# Patient Record
Sex: FEMALE | Race: BLACK OR AFRICAN AMERICAN | NOT HISPANIC OR LATINO | Employment: PART TIME | URBAN - METROPOLITAN AREA
[De-identification: names, ages, dates, MRNs, and addresses within clinical notes are randomized per-mention and may not be internally consistent; named-entity substitution may affect disease eponyms.]

---

## 2017-11-10 ENCOUNTER — ALLIED HEALTH/NURSE VISIT (OUTPATIENT)
Dept: NURSING | Facility: CLINIC | Age: 22
End: 2017-11-10
Payer: COMMERCIAL

## 2017-11-10 DIAGNOSIS — Z23 NEED FOR PROPHYLACTIC VACCINATION AND INOCULATION AGAINST INFLUENZA: Primary | ICD-10-CM

## 2017-11-10 PROCEDURE — 90686 IIV4 VACC NO PRSV 0.5 ML IM: CPT

## 2017-11-10 PROCEDURE — 90471 IMMUNIZATION ADMIN: CPT

## 2017-11-10 PROCEDURE — 99207 ZZC NO CHARGE NURSE ONLY: CPT

## 2017-11-10 NOTE — MR AVS SNAPSHOT
After Visit Summary   11/10/2017    Kamilah Lambert    MRN: [id number]           Patient Information     Date Of Birth          1995        Visit Information        Provider Department      11/10/2017 10:40 AM [facility]        Today's Diagnoses     Need for prophylactic vaccination and inoculation against influenza    -  1       Follow-ups after your visit        Who to contact     If you have questions or need follow up information about today's clinic visit or your schedule please contact [facility] directly at [phone].  Normal or non-critical lab and imaging results will be communicated to you by [employer], letter or phone within 4 business days after the clinic has received the results. If you do not hear from us within 7 days, please contact the clinic through [employer] or phone. If you have a critical or abnormal lab result, we will notify you by phone as soon as possible.  Submit refill requests through [employer] or call your pharmacy and they will forward the refill request to us. Please allow 3 business days for your refill to be completed.          Additional Information About Your Visit        MyChart Information     [employer] gives you secure access to your electronic health record. If you see a primary care provider, you can also send messages to your care team and make appointments. If you have questions, please call your primary care clinic.  If you do not have a primary care provider, please call [phone] and they will assist you.        Care EveryWhere ID     This is your Care EveryWhere ID. This could be used by other organizations to access your [facility] medical records  [id number]         Blood Pressure from Last 3 Encounters:   01/14/16 127/76   09/20/15 112/77   06/01/15 136/83    Weight from Last 3 Encounters:   01/14/16 220 lb (99.8 kg) (99 %)*   09/20/15 217 lb (98.4 kg) (99 %)*   06/01/15 219 lb (99.3 kg) (99 %)*      * Growth percentiles are based on [facility] 2-20 Years data.              We Performed the Following     FLU VAC, SPLIT VIRUS IM > 3 YO (QUADRIVALENT) [82484]     Vaccine Administration, Initial [[id number]]        Primary Care Provider Office Phone # Fax #    Jean Pierre Broussard [phone]       [address]  [facility] [address]        Equal Access to Services     [facility]: Hadii aad ku hadasho Soomaali, waaxda luqadaha, qaybta kaalmada adeegyada, waxay idiin hayaan adeeg kharash la'aan ah. So [facility] [phone].    ATENCIÓN: Si habla español, tiene a ramirez disposición servicios gratuitos de asistencia lingüística. Llame al [phone].    We comply with applicable federal civil rights laws and [place] laws. We do not discriminate on the basis of race, color, national origin, age, disability, sex, sexual orientation, or gender identity.            Thank you!     Thank you for choosing [facility]  for your care. Our goal is always to provide you with excellent care. Hearing back from our patients is one way we can continue to improve our services. Please take a few minutes to complete the written survey that you may receive in the mail after your visit with us. Thank you!             Your Updated Medication List - Protect others around you: Learn how to safely use, store and throw away your medicines at www.disposemymeds.org.      Notice  As of 11/10/2017 10:50 AM    You have not been prescribed any medications.

## 2017-11-10 NOTE — PROGRESS NOTES

## 2018-12-06 ENCOUNTER — TELEPHONE (OUTPATIENT)
Dept: FAMILY MEDICINE | Facility: CLINIC | Age: 23
End: 2018-12-06

## 2018-12-06 DIAGNOSIS — A09 TRAVELER'S DIARRHEA: ICD-10-CM

## 2018-12-06 DIAGNOSIS — Z79.2 PROPHYLACTIC ANTIBIOTIC: ICD-10-CM

## 2018-12-06 DIAGNOSIS — Z71.84 TRAVEL ADVICE ENCOUNTER: Primary | ICD-10-CM

## 2018-12-06 RX ORDER — MEFLOQUINE HYDROCHLORIDE 250 MG/1
250 TABLET ORAL
Qty: 12 TABLET | Refills: 0 | Status: SHIPPED | OUTPATIENT
Start: 2018-12-06 | End: 2018-12-22

## 2018-12-06 NOTE — TELEPHONE ENCOUNTER
Reason for call:  Medication   If this is a refill request, has the caller requested the refill from the pharmacy already? No  Will the patient be using a [facility]? No  Name of the pharmacy and phone number for the current request: [facility] pharmacy  [phone]    Name of the medication requested: malaria medicine    Other request: she will be gone for 4 weeks    Phone number to reach patient:  Work number on file:  [phone] (work)    Best Time:  any    Can we leave a detailed message on this number?  YES

## 2018-12-06 NOTE — TELEPHONE ENCOUNTER
Routing to provider to review and advise. Patient asking for Malaria medication, will be traveling out of country and gone for 4 weeks.   Patient has not seen you since 2016. Last primary visit was same date.  Are you willing to fill? Otherwise, suggest  or [place] [facility], unless have other thoughts?  Thank you!    Marlena Moya RN  [facility] Triage

## 2018-12-07 NOTE — TELEPHONE ENCOUNTER
This writer attempted to contact patient on 12/07/18      Reason for call medication request and left detailed message.      If patient calls back:   Relay message , (read verbatim), document that pt called and close encounter        Estephania Thornton, CMA

## 2018-12-21 NOTE — TELEPHONE ENCOUNTER
Reason for Call:  Other prescription    Detailed comments: [place] for possible infections she may inquire.  [facility] - [facility], [place] - [address]   Please call when ready.    Phone Number Patient can be reached at: Cell number on file:    Telephone Information:   Mobile [phone]     Best Time: any    Can we leave a detailed message on this number? YES    Call taken on 12/21/2018 at 3:21 PM by Nannette Woodruff

## 2018-12-22 DIAGNOSIS — Z79.2 PROPHYLACTIC ANTIBIOTIC: ICD-10-CM

## 2018-12-22 DIAGNOSIS — Z71.84 TRAVEL ADVICE ENCOUNTER: ICD-10-CM

## 2018-12-22 NOTE — TELEPHONE ENCOUNTER
Requested Prescriptions   Pending Prescriptions Disp Refills     mefloquine (LARIAM) 250 MG tablet [Pharmacy Med Name: mefloquine 250 mg tablet]      Last Written Prescription Date:  12/6/18  Last Fill Quantity: 12,  # refills: 0   Last Office Visit with [facility], [facility] or [facility] prescribing provider:  1/14/16   Future Office Visit:      12 tablet 0     Sig: Take 1 tablet (250 mg) by mouth every 7 days on the same day each week.  Begin 1 week before travel & continue for 4 weeks after return home    There is no refill protocol information for this order              Federico Faarax  [facility]

## 2018-12-24 NOTE — TELEPHONE ENCOUNTER
Routing refill request to provider for review/approval because:  Drug not on the [facility] refill protocol.    Agueda Abbott RN, [facility]

## 2018-12-26 RX ORDER — AZITHROMYCIN 250 MG/1
500 TABLET, FILM COATED ORAL DAILY
Qty: 6 TABLET | Refills: 0 | Status: SHIPPED | OUTPATIENT
Start: 2018-12-26 | End: 2018-12-29

## 2018-12-28 RX ORDER — MEFLOQUINE HYDROCHLORIDE 250 MG/1
250 TABLET ORAL
Qty: 12 TABLET | Refills: 0 | Status: SHIPPED | OUTPATIENT
Start: 2018-12-28 | End: 2019-08-23

## 2019-08-12 ENCOUNTER — OFFICE VISIT (OUTPATIENT)
Dept: URGENT CARE | Facility: URGENT CARE | Age: 24
End: 2019-08-12
Payer: COMMERCIAL

## 2019-08-12 ENCOUNTER — TRANSFERRED RECORDS (OUTPATIENT)
Dept: HEALTH INFORMATION MANAGEMENT | Facility: CLINIC | Age: 24
End: 2019-08-12

## 2019-08-12 VITALS
OXYGEN SATURATION: 100 % | TEMPERATURE: 98.8 F | RESPIRATION RATE: 16 BRPM | HEIGHT: 67 IN | WEIGHT: 231.4 LBS | SYSTOLIC BLOOD PRESSURE: 114 MMHG | DIASTOLIC BLOOD PRESSURE: 80 MMHG | BODY MASS INDEX: 36.32 KG/M2 | HEART RATE: 96 BPM
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
## 2019-08-12 DIAGNOSIS — M54.2 CERVICAL PAIN: ICD-10-CM

## 2019-08-12 DIAGNOSIS — M54.9 ACUTE BILATERAL BACK PAIN, UNSPECIFIED BACK LOCATION: ICD-10-CM

## 2019-08-12 PROCEDURE — 99214 OFFICE O/P EST MOD 30 MIN: CPT | Performed by: NURSE PRACTITIONER

## 2019-08-12 ASSESSMENT — ENCOUNTER SYMPTOMS
NAUSEA: 0
BACK PAIN: 1
PHOTOPHOBIA: 0
LIGHT-HEADEDNESS: 0
HEADACHES: 0
DIZZINESS: 0
WEAKNESS: 0
NECK STIFFNESS: 1
CONSTITUTIONAL NEGATIVE: 1
MYALGIAS: 1
NECK PAIN: 1
PSYCHIATRIC NEGATIVE: 1
NUMBNESS: 0

## 2019-08-12 ASSESSMENT — MIFFLIN-ST. JEOR: SCORE: 1852.37

## 2019-08-12 ASSESSMENT — PAIN SCALES - GENERAL: PAINLEVEL: MODERATE PAIN (5)
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
## 2019-08-23 ENCOUNTER — OFFICE VISIT (OUTPATIENT)
Dept: FAMILY MEDICINE | Facility: CLINIC | Age: 24
End: 2019-08-23
Payer: COMMERCIAL

## 2019-08-23 VITALS
BODY MASS INDEX: 36.57 KG/M2 | HEIGHT: 67 IN | OXYGEN SATURATION: 99 % | SYSTOLIC BLOOD PRESSURE: 126 MMHG | DIASTOLIC BLOOD PRESSURE: 84 MMHG | WEIGHT: 233 LBS | HEART RATE: 103 BPM | TEMPERATURE: 98.8 F
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
## 2019-08-23 DIAGNOSIS — M41.9 SCOLIOSIS OF CERVICOTHORACIC SPINE, UNSPECIFIED SCOLIOSIS TYPE: ICD-10-CM

## 2019-08-23 DIAGNOSIS — M41.9 SCOLIOSIS OF LUMBAR SPINE, UNSPECIFIED SCOLIOSIS TYPE: Primary | ICD-10-CM

## 2019-08-23 DIAGNOSIS — M54.50 ACUTE MIDLINE LOW BACK PAIN WITHOUT SCIATICA: ICD-10-CM

## 2019-08-23 PROBLEM — E66.812 OBESITY, CLASS II, BMI 35-39.9: Status: ACTIVE | Noted: 2019-08-23

## 2019-08-23 PROCEDURE — 99213 OFFICE O/P EST LOW 20 MIN: CPT | Performed by: FAMILY MEDICINE

## 2019-08-23 RX ORDER — CYCLOBENZAPRINE HCL 10 MG
10 TABLET ORAL
COMMUNITY
Start: 2019-08-12 | End: 2024-01-26

## 2019-08-23 ASSESSMENT — MIFFLIN-ST. JEOR: SCORE: 1859.63

## 2019-08-23 ASSESSMENT — PAIN SCALES - GENERAL: PAINLEVEL: SEVERE PAIN (6)
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
## 2019-08-23 NOTE — PATIENT INSTRUCTIONS
At [facility], we strive to deliver an exceptional experience to you, every time we see you.  If you receive a survey in the mail, please send us back your thoughts. We really do value your feedback.    Your care team:                            Family Medicine Internal Medicine   MD Eb Meek MD Shantel Branch-Fleming, MD Katya Georgiev PA-C Megan Hill, APRN CNP    Barrington Ruiz MD Pediatrics   Miguelangel Winston, ROLANDA Carter, MD Areli Finn APRN CNP   MD Sue Anguiano MD Deborah Mielke, MD Kellie Pisano, APRN [facility]      Clinic hours: Monday - Thursday 7 am-7 pm; Fridays 7 am-5 pm.   Urgent care: Monday - Friday 11 am-9 pm; Saturday and Sunday 9 am-5 pm.  Pharmacy : Monday -Thursday 8 am-8 pm; Friday 8 am-6 pm; Saturday and Sunday 9 am-5 pm.     Clinic: [phone]   Pharmacy: [phone]            Patient Education     Your Scoliosis Evaluation  If a school screening or your health care provider finds signs of scoliosis, you ll visit a special bone healthcare provider (orthopedist) for an evaluation. This visit helps determine what treatment may be best for you.    During your evaluation  Your bone health care provider may:    Ask you about your health history (for instance, if you ve ever had surgery) and family history (if someone in your family has scoliosis)    Take X-rays of your back to get a closer look at your spine    Decide how much more you are likely to grow. (To help do this, the health care provider may ask girls if they ve gotten their period yet.)    Examine your back while you re standing up and bending over    Measure the size, location, and pattern of the spinal curve    Test how flexible your spine is, and how strong your back and neck muscles are  Why treat scoliosis?  A serious spinal curve that isn t treated can get worse as you grow. Over time, it may cause problems. These can include:    Back  pain    Arthritis in your back    Your body looking twisted    Clothes not fitting right    Heart and breathing problems (rare)  Date Last Reviewed: 5/1/2018 2000-2018 [employer]. [address] [place] [address]. All rights reserved. This information is not intended as a substitute for professional medical care. Always follow your healthcare professional's instructions.           Patient Education     Anatomy of a Normal Spine  The spinal column is a stack of bones (vertebrae) that are  by soft pads of tissue (disks). In the middle of each of these bones there is a canal that runs top to bottom. Together these canals form a tunnel called the spinal canal. Running through the spinal canal is a long sausage-like bundle of nerves and nerve cells called the spinal cord. These nerve fibers carry signals between the brain and body. The spinal cord is surrounded by the cerebrospinal fluid and protective layers called meninges, just like the brain.     The spine has three natural curves: the cervical, the thoracic, and the lumbar.   The parts of the spine  The spine is made up of the following parts:    The vertebrae (not including the sacrum and coccyx) are the 24 bones that connect like puzzle pieces to make up the spine.    The lamina of each vertebra forms the back of the spinal canal.    A foramen is a small bony opening. This is where a nerve, on each side of the spinal cord, leaves the spinal canal.    The transverse process is the wing of bone on either side of each vertebra.    The spinous process is the back part of each vertebra you can feel through your skin.    A disk lies between each of the vertebrae and acts as a cushion.     Two vertebrae with a disk between them   Date Last Reviewed: 5/1/2018 2000-2018 [employer]. [address] [place] [address]. All rights reserved. This information is not intended as a substitute for professional medical  care. Always follow your healthcare professional's instructions.
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
## 2019-08-30 ENCOUNTER — ANCILLARY PROCEDURE (OUTPATIENT)
Dept: GENERAL RADIOLOGY | Facility: CLINIC | Age: 24
End: 2019-08-30
Attending: PEDIATRICS
Payer: COMMERCIAL

## 2019-08-30 ENCOUNTER — OFFICE VISIT (OUTPATIENT)
Dept: ORTHOPEDICS | Facility: CLINIC | Age: 24
End: 2019-08-30
Payer: COMMERCIAL

## 2019-08-30 VITALS
HEIGHT: 67 IN | WEIGHT: 233 LBS | DIASTOLIC BLOOD PRESSURE: 90 MMHG | SYSTOLIC BLOOD PRESSURE: 130 MMHG | BODY MASS INDEX: 36.57 KG/M2

## 2019-08-30 DIAGNOSIS — M41.9 SCOLIOSIS OF THORACOLUMBAR SPINE, UNSPECIFIED SCOLIOSIS TYPE: ICD-10-CM

## 2019-08-30 DIAGNOSIS — M54.5 ACUTE BILATERAL LOW BACK PAIN, WITH SCIATICA PRESENCE UNSPECIFIED: Primary | ICD-10-CM

## 2019-08-30 PROCEDURE — 72100 X-RAY EXAM L-S SPINE 2/3 VWS: CPT

## 2019-08-30 PROCEDURE — 72070 X-RAY EXAM THORAC SPINE 2VWS: CPT

## 2019-08-30 PROCEDURE — 99243 OFF/OP CNSLTJ NEW/EST LOW 30: CPT | Performed by: PEDIATRICS

## 2019-08-30 ASSESSMENT — MIFFLIN-ST. JEOR: SCORE: 1859.59

## 2019-08-30 NOTE — PATIENT INSTRUCTIONS
-Physical therapy referral placed; phone number is [phone] for the [facility]  -Can continue with chiropractic care  -MRI may be a consideration if not improving with therapy and time    Follow up in 1 month, sooner if needed    Ifeyinwa to follow up with Primary Care provider regarding elevated blood pressure.
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
## 2020-02-23 ENCOUNTER — HEALTH MAINTENANCE LETTER (OUTPATIENT)
Age: 25
End: 2020-02-23

## 2020-10-29 ENCOUNTER — IMMUNIZATION (OUTPATIENT)
Dept: NURSING | Facility: CLINIC | Age: 25
End: 2020-10-29
Payer: COMMERCIAL

## 2020-10-29 DIAGNOSIS — Z23 NEED FOR PROPHYLACTIC VACCINATION AND INOCULATION AGAINST INFLUENZA: Primary | ICD-10-CM

## 2020-10-29 PROCEDURE — 90686 IIV4 VACC NO PRSV 0.5 ML IM: CPT

## 2020-10-29 PROCEDURE — 99207 PR NO CHARGE NURSE ONLY: CPT

## 2020-10-29 PROCEDURE — 90471 IMMUNIZATION ADMIN: CPT

## 2021-04-11 ENCOUNTER — HEALTH MAINTENANCE LETTER (OUTPATIENT)
Age: 26
End: 2021-04-11

## 2021-09-26 ENCOUNTER — HEALTH MAINTENANCE LETTER (OUTPATIENT)
Age: 26
End: 2021-09-26

## 2022-05-08 ENCOUNTER — HEALTH MAINTENANCE LETTER (OUTPATIENT)
Age: 27
End: 2022-05-08

## 2022-10-10 ENCOUNTER — ANCILLARY PROCEDURE (OUTPATIENT)
Dept: GENERAL RADIOLOGY | Facility: CLINIC | Age: 27
End: 2022-10-10
Attending: EMERGENCY MEDICINE
Payer: COMMERCIAL

## 2022-10-10 ENCOUNTER — OFFICE VISIT (OUTPATIENT)
Dept: URGENT CARE | Facility: URGENT CARE | Age: 27
End: 2022-10-10
Payer: COMMERCIAL

## 2022-10-10 VITALS
WEIGHT: 239.8 LBS | SYSTOLIC BLOOD PRESSURE: 117 MMHG | BODY MASS INDEX: 37.2 KG/M2 | OXYGEN SATURATION: 100 % | RESPIRATION RATE: 20 BRPM | TEMPERATURE: 98.2 F | HEART RATE: 90 BPM | DIASTOLIC BLOOD PRESSURE: 83 MMHG

## 2022-10-10 DIAGNOSIS — S49.92XA SHOULDER INJURY, LEFT, INITIAL ENCOUNTER: ICD-10-CM

## 2022-10-10 DIAGNOSIS — S69.91XA INJURY OF RIGHT WRIST, INITIAL ENCOUNTER: Primary | ICD-10-CM

## 2022-10-10 DIAGNOSIS — S69.91XA INJURY OF RIGHT WRIST, INITIAL ENCOUNTER: ICD-10-CM

## 2022-10-10 PROCEDURE — 73110 X-RAY EXAM OF WRIST: CPT | Mod: TC | Performed by: RADIOLOGY

## 2022-10-10 PROCEDURE — 73030 X-RAY EXAM OF SHOULDER: CPT | Mod: TC | Performed by: RADIOLOGY

## 2022-10-10 PROCEDURE — 99204 OFFICE O/P NEW MOD 45 MIN: CPT | Performed by: EMERGENCY MEDICINE

## 2022-10-10 NOTE — PROGRESS NOTES
Assessment & Plan     Diagnosis:    (S69.91XA) Injury of right wrist, initial encounter  (primary encounter diagnosis)  Plan: XR Wrist Right G/E 3 Views, Wrist/Arm/Hand         Supplies Order for DME - ONLY FOR DME,         Orthopedic  Referral    (S49.92XA) Shoulder injury, left, initial encounter      Medical Decision Making:  Kamilah Lambert is a 27 year old female who presents for evaluation of right wrist and left shoulder pain following two separate falls while ice skating over the last 3 weeks.      Differential diagnosis includes scaphoid fracture or other wrist fracture, dislocation, AC joint or sternoclavicular joint pathology, rotator cuff injury, shoulder dislocation, humoral fracture. C-spine is cleared clinically here by NEXUS criteria. There is no sign of vascular or neurologic compromise and full range of motion is intact.     X-rays of the right wrist and left shoulder are unremarkable, I suspect likely muscular or soft tissue injury in origin and patient was provided with a sling anti-inflammatories.  Patient does have some snuffbox tenderness at the right wrist and given its been 3 weeks and she continues to have tenderness here, I recommended that she follow-up with orthopedics for repeat x-rays in 2 to 3 weeks to ensure there is no occult scaphoid fracture.  Thumb-spica wrist splint placed here and I recommended Tylenol/Ibuprofen, RICE therapy and close follow up with orthopedics.    Patient voices understanding and agreement with the plan including reasons to go to the ER immediately as well as to be seen by a more consistent care-giver, such as their PCP, if the symptoms persist more 1 week.      Refugio Sexton PA-C  [facility]    Subjective     Kamilah Lambert is a 27 year old female who presents to clinic today for the following health issues:  Chief Complaint   Patient presents with     Shoulder Pain     left     Wrist Pain     right       HPI    MS  Injury/Pain    Onset of symptoms was 3 week(s) ago.  She states that she fell ice-skating twice, once was 3 weeks ago and once was 2 weeks ago.  She injured her right wrist 3 weeks ago and her left shoulder 2 weeks ago.  Since then she has been having stiffness, waxing and waning pain notes to make sure nothing is broken or dislocated.  She notes that she has some tingling sensations in her left arm once in a while, nothing consistent.  Denies any true numbness or weakness in the arms.  She denies any neck pain, head injuries, LOC, chest pain, difficulty breathing, or any other injuries.      Review of Systems    See HPI    Objective      Vitals: /83   Pulse 90   Temp 98.2  F (36.8  C) (Tympanic)   Resp 20   Wt 108.8 kg (239 lb 12.8 oz)   SpO2 100%   BMI 37.20 kg/m      Patient Vitals for the past 24 hrs:   BP Temp Temp src Pulse Resp SpO2 Weight   10/10/22 1230 117/83 98.2  F (36.8  C) Tympanic 90 20 100 % 108.8 kg (239 lb 12.8 oz)       Vital signs reviewed by: Refugio Sexton PA-C    Physical Exam   Constitutional: Patient is alert and cooperative. No acute distress.  Mouth: Mucous membranes are moist. Normal tongue and tonsil. Posterior oropharynx is clear.  Eyes: Conjunctivae, EOMI and lids are normal.  Cardiovascular: Regular rate and rhythm.  Pulmonary/Chest: Lungs are clear to auscultation throughout. Effort normal. No respiratory distress. No wheezes, rales or rhonchi  Neurological: Alert and oriented x3. Strength and sensation are intact and symmetric in the bilateral upper extremities.   MSK: Left shoulder with no bony tenderness to palpation, no AC joint tenderness, no skin tenting ecchymosis or bruising.  Range of motion intact.  Abduction and internal rotation exacerbate pain.  Distal CMS intact in the bilateral upper extremities.  Right wrist: Snuffbox tenderness.  Normal range of motion.  No swelling, ecchymosis or deformity.  Skin: No rash noted on visualized skin.  Psychiatric:The  patient has a normal mood and affect.       Labs/Imaging:  Results for orders placed or performed in visit on 10/10/22   XR Shoulder Left G/E 3 Views     Status: None    Narrative    EXAM: SHOULDER LEFT THREE OR MORE VIEWS   DATE/TIME: 10/10/2022 1:13 PM     INDICATION: Left shoulder injury and pain.  COMPARISON: None.      Impression    IMPRESSION: Normal joint spacing and alignment. No fracture.    ADELIA HARO MD         SYSTEM ID:  [email or web address]   Results for orders placed or performed in visit on 10/10/22   XR Wrist Right G/E 3 Views     Status: None    Narrative    RIGHT WRIST THREE OR MORE VIEWS  10/10/2022 1:15 PM     INDICATION: Right wrist injury and pain.    COMPARISON: None.      Impression    IMPRESSION: Normal joint spacing and alignment.  No fracture.     ADELIA HARO MD         SYSTEM ID:  [email or web address]     Reading per radiology        Refugio Sexton PA-C, October 10, 2022

## 2022-10-11 NOTE — TELEPHONE ENCOUNTER
DIAGNOSIS: Injury of right wrist, initial encounter [S69.91XA]  Shoulder injury, left, initial encounter    APPOINTMENT DATE: 10/13/2022   NOTES STATUS DETAILS   OFFICE NOTE from referring provider Internal 10/10/2022 Dr Sexton [facility]    OFFICE NOTE from other specialist N/A    DISCHARGE SUMMARY from hospital N/A    DISCHARGE REPORT from the ER N/A    OPERATIVE REPORT N/A    EMG report N/A    MEDICATION LIST N/A    MRI N/A    DEXA (osteoporosis/bone health) N/A    CT SCAN N/A    XRAYS (IMAGES & REPORTS) Internal 10/10/2022 RT wrist, LFT shoulder

## 2022-10-13 ENCOUNTER — PRE VISIT (OUTPATIENT)
Dept: ORTHOPEDICS | Facility: CLINIC | Age: 27
End: 2022-10-13

## 2022-10-13 ENCOUNTER — OFFICE VISIT (OUTPATIENT)
Dept: ORTHOPEDICS | Facility: CLINIC | Age: 27
End: 2022-10-13
Attending: EMERGENCY MEDICINE
Payer: COMMERCIAL

## 2022-10-13 DIAGNOSIS — S69.91XA INJURY OF RIGHT WRIST, INITIAL ENCOUNTER: ICD-10-CM

## 2022-10-13 DIAGNOSIS — S49.92XA SHOULDER INJURY, LEFT, INITIAL ENCOUNTER: ICD-10-CM

## 2022-10-13 PROCEDURE — 99204 OFFICE O/P NEW MOD 45 MIN: CPT | Performed by: FAMILY MEDICINE

## 2022-10-13 ASSESSMENT — PAIN SCALES - GENERAL: PAINLEVEL: SEVERE PAIN (7)

## 2022-10-13 NOTE — LETTER
10/13/2022         RE: Kamilah Lambert  [address]  [place] [address]        Dear Colleague,    Thank you for referring your patient, Kamilah Lambert, to the [facility] [place]. Please see a copy of my visit note below.    CHIEF COMPLAINT:  Pain of the Left Shoulder and Pain of the Right Wrist       HISTORY OF PRESENT ILLNESS  Ms. Lambert is a pleasant 27 year old female who presents to clinic today with pain in her shoulder and wrist.  Kamilah is taking ice-skating classes, she injured both of these while ice-skating in 2 separate events.  About 4 weeks ago she fell with an outstretched hand, feeling immediate pain at her right wrist.  In a separate fall she fell onto her left shoulder, this was about 3 weeks ago.  Her shoulder seems to be the worst issue today, although both of these are quite severe.  She was seen at urgent care few days ago and had reassuring x-rays, she was given a sling for her left shoulder and a brace for her right wrist.        Additional history: as documented    MEDICAL HISTORY  Patient Active Problem List   Diagnosis     Scoliosis of lumbar spine, unspecified scoliosis type     Scoliosis of cervicothoracic spine, unspecified scoliosis type     Obesity, Class II, BMI 35-39.9       Current Outpatient Medications   Medication Sig Dispense Refill     cyclobenzaprine (FLEXERIL) 10 MG tablet Take 10 mg by mouth (Patient not taking: Reported on 10/10/2022)         No Known Allergies    Family History   Problem Relation Age of Onset     Diabetes No family hx of      Hypertension No family hx of      Breast Cancer No family hx of      Cancer - colorectal No family hx of        Additional medical/Social/Surgical histories reviewed in [facility] and updated as appropriate.        PHYSICAL EXAM  General  - normal appearance, in no obvious distress  Musculoskeletal - right wrist  - inspection: normal joint alignment, no obvious deformity,  no swelling  - palpation: tender at anatomic snuffbox  - ROM:  90 deg flexion   70 deg extension   25 deg abduction   65 deg adduction  - strength: 5/5  strength    Musculoskeletal - left shoulder  - inspection: normal bone and joint alignment, no obvious deformity, no scapular winging, no AC step-off, mild swelling over AC joint, normal clavicle  - palpation: tender over AC joint, clavicle does not displace when pressed  - ROM: painful passive flexion past 90 deg, painful adduction  - strength: 5/5  strength, 5/5 in all shoulder planes  - special tests:  (+) crossarm  (-) Speed's  (+) Hawkin's    Neuro  - no sensory or motor deficit, grossly normal coordination, normal muscle tone             ASSESSMENT & PLAN  Ms. Lambert is a 27 year old female who presents to clinic today with pain in her right wrist and left shoulder after separate falls while ice-skating.    I reviewed her x-rays in the room with her, her both her wrist and her shoulder x-rays show no obvious acute or chronic issues.    It does seem that her left shoulder is bothering her the most, clinically she has evidence of an AC separation.  This is likely grade 2.  We discussed pathophysiology and treatment strategies, I do think that it would be in her best interest to continue to wear her sling as helpful, attend physical therapy, and to continue to work on range of motion.  I would expect that her symptoms would resolve over the coming weeks, if she is not improving I would likely recommend an MRI.    With regards to her wrist this does seem to be a more pressing issue in the sense that she has exquisite tenderness over the anatomic snuffbox.  This does raise some concern for an occult scaphoid fracture.  I am ordering an MRI of her wrist.  I will get in touch with her with the results.    Lastly, I am prescribing her Zanaflex for nighttime use.    It was a pleasure seeing Kamilah today.    Juvencio Ortega, DO, CAQSM  Primary Care Sports  Medicine      This note was constructed using Dragon dictation software, please excuse any minor errors in spelling, grammar, or syntax.        Again, thank you for allowing me to participate in the care of your patient.        Sincerely,        Juvencio Ortega, DO

## 2022-10-13 NOTE — PROGRESS NOTES
CHIEF COMPLAINT:  Pain of the Left Shoulder and Pain of the Right Wrist       HISTORY OF PRESENT ILLNESS  Ms. Lambert is a pleasant 27 year old female who presents to clinic today with pain in her shoulder and wrist.  Kamilah is taking ice-skating classes, she injured both of these while ice-skating in 2 separate events.  About 4 weeks ago she fell with an outstretched hand, feeling immediate pain at her right wrist.  In a separate fall she fell onto her left shoulder, this was about 3 weeks ago.  Her shoulder seems to be the worst issue today, although both of these are quite severe.  She was seen at urgent care few days ago and had reassuring x-rays, she was given a sling for her left shoulder and a brace for her right wrist.        Additional history: as documented    MEDICAL HISTORY  Patient Active Problem List   Diagnosis     Scoliosis of lumbar spine, unspecified scoliosis type     Scoliosis of cervicothoracic spine, unspecified scoliosis type     Obesity, Class II, BMI 35-39.9       Current Outpatient Medications   Medication Sig Dispense Refill     cyclobenzaprine (FLEXERIL) 10 MG tablet Take 10 mg by mouth (Patient not taking: Reported on 10/10/2022)         No Known Allergies    Family History   Problem Relation Age of Onset     Diabetes No family hx of      Hypertension No family hx of      Breast Cancer No family hx of      Cancer - colorectal No family hx of        Additional medical/Social/Surgical histories reviewed in [facility] and updated as appropriate.        PHYSICAL EXAM  General  - normal appearance, in no obvious distress  Musculoskeletal - right wrist  - inspection: normal joint alignment, no obvious deformity, no swelling  - palpation: tender at anatomic snuffbox  - ROM:  90 deg flexion   70 deg extension   25 deg abduction   65 deg adduction  - strength: 5/5  strength    Musculoskeletal - left shoulder  - inspection: normal bone and joint alignment, no obvious deformity, no scapular  winging, no AC step-off, mild swelling over AC joint, normal clavicle  - palpation: tender over AC joint, clavicle does not displace when pressed  - ROM: painful passive flexion past 90 deg, painful adduction  - strength: 5/5  strength, 5/5 in all shoulder planes  - special tests:  (+) crossarm  (-) Speed's  (+) Hawkin's    Neuro  - no sensory or motor deficit, grossly normal coordination, normal muscle tone             ASSESSMENT & PLAN  Ms. Lambert is a 27 year old female who presents to clinic today with pain in her right wrist and left shoulder after separate falls while ice-skating.    I reviewed her x-rays in the room with her, her both her wrist and her shoulder x-rays show no obvious acute or chronic issues.    It does seem that her left shoulder is bothering her the most, clinically she has evidence of an AC separation.  This is likely grade 2.  We discussed pathophysiology and treatment strategies, I do think that it would be in her best interest to continue to wear her sling as helpful, attend physical therapy, and to continue to work on range of motion.  I would expect that her symptoms would resolve over the coming weeks, if she is not improving I would likely recommend an MRI.    With regards to her wrist this does seem to be a more pressing issue in the sense that she has exquisite tenderness over the anatomic snuffbox.  This does raise some concern for an occult scaphoid fracture.  I am ordering an MRI of her wrist.  I will get in touch with her with the results.    Lastly, I am prescribing her Zanaflex for nighttime use.    It was a pleasure seeing Kamilah today.    Juvencio Ortega DO, [facility]  Primary Care Sports Medicine      This note was constructed using Dragon dictation software, please excuse any minor errors in spelling, grammar, or syntax.

## 2022-10-13 NOTE — NURSING NOTE
Chief Complaint   Patient presents with     Left Shoulder - Pain     Right Wrist - Pain       There were no vitals filed for this visit.    There is no height or weight on file to calculate BMI.      BARRERA Proctor NREMT

## 2023-01-05 ENCOUNTER — APPOINTMENT (OUTPATIENT)
Dept: LAB | Facility: CLINIC | Age: 28
End: 2023-01-05
Payer: COMMERCIAL

## 2023-06-02 ENCOUNTER — HEALTH MAINTENANCE LETTER (OUTPATIENT)
Age: 28
End: 2023-06-02

## 2023-11-15 ENCOUNTER — IMMUNIZATION (OUTPATIENT)
Dept: NURSING | Facility: CLINIC | Age: 28
End: 2023-11-15
Payer: COMMERCIAL

## 2023-11-15 PROCEDURE — 90686 IIV4 VACC NO PRSV 0.5 ML IM: CPT

## 2023-11-15 PROCEDURE — 90480 ADMN SARSCOV2 VAC 1/ONLY CMP: CPT

## 2023-11-15 PROCEDURE — 90471 IMMUNIZATION ADMIN: CPT

## 2023-11-15 PROCEDURE — 91320 SARSCV2 VAC 30MCG TRS-SUC IM: CPT

## 2024-01-26 ENCOUNTER — LAB (OUTPATIENT)
Dept: LAB | Facility: CLINIC | Age: 29
End: 2024-01-26
Payer: COMMERCIAL

## 2024-01-26 ENCOUNTER — VIRTUAL VISIT (OUTPATIENT)
Dept: URGENT CARE | Facility: CLINIC | Age: 29
End: 2024-01-26
Payer: COMMERCIAL

## 2024-01-26 DIAGNOSIS — R35.0 URINARY FREQUENCY: ICD-10-CM

## 2024-01-26 DIAGNOSIS — R30.0 DYSURIA: ICD-10-CM

## 2024-01-26 DIAGNOSIS — B37.31 YEAST INFECTION OF THE VAGINA: ICD-10-CM

## 2024-01-26 DIAGNOSIS — B96.89 BACTERIAL VAGINOSIS: Primary | ICD-10-CM

## 2024-01-26 DIAGNOSIS — N76.0 BACTERIAL VAGINOSIS: Primary | ICD-10-CM

## 2024-01-26 LAB
ALBUMIN UR-MCNC: NEGATIVE MG/DL
APPEARANCE UR: CLEAR
BILIRUB UR QL STRIP: NEGATIVE
CLUE CELLS: PRESENT
COLOR UR AUTO: YELLOW
GLUCOSE UR STRIP-MCNC: NEGATIVE MG/DL
HGB UR QL STRIP: NEGATIVE
KETONES UR STRIP-MCNC: NEGATIVE MG/DL
LEUKOCYTE ESTERASE UR QL STRIP: NEGATIVE
NITRATE UR QL: NEGATIVE
PH UR STRIP: 7 [PH] (ref 5–7)
SP GR UR STRIP: 1.01 (ref 1–1.03)
TRICHOMONAS, WET PREP: ABNORMAL
UROBILINOGEN UR STRIP-ACNC: 0.2 E.U./DL
WBC'S/HIGH POWER FIELD, WET PREP: ABNORMAL
YEAST, WET PREP: PRESENT

## 2024-01-26 PROCEDURE — 87210 SMEAR WET MOUNT SALINE/INK: CPT

## 2024-01-26 PROCEDURE — 81003 URINALYSIS AUTO W/O SCOPE: CPT

## 2024-01-26 PROCEDURE — 99442 PR PHYSICIAN TELEPHONE EVALUATION 11-20 MIN: CPT | Mod: 93

## 2024-01-26 PROCEDURE — 87086 URINE CULTURE/COLONY COUNT: CPT

## 2024-01-26 RX ORDER — FLUCONAZOLE 150 MG/1
150 TABLET ORAL ONCE
Qty: 1 TABLET | Refills: 0 | Status: SHIPPED | OUTPATIENT
Start: 2024-01-26 | End: 2024-01-26

## 2024-01-26 RX ORDER — METRONIDAZOLE 500 MG/1
500 TABLET ORAL 2 TIMES DAILY
Qty: 14 TABLET | Refills: 0 | Status: SHIPPED | OUTPATIENT
Start: 2024-01-26 | End: 2024-02-02

## 2024-01-26 NOTE — PROGRESS NOTES
Assessment & Plan     Bacterial vaginosis  - metroNIDAZOLE (FLAGYL) 500 MG tablet; Take 1 tablet (500 mg) by mouth 2 times daily for 7 days    Yeast infection of the vagina  - fluconazole (DIFLUCAN) 150 MG tablet; Take 1 tablet (150 mg) by mouth once for 1 dose    Dysuria  - UA reflex to Microscopic - lab collect; Future  - Urine Culture Aerobic Bacterial - lab collect; Future  - Wet prep - lab collect; Future    Urinary frequency  - UA reflex to Microscopic - lab collect; Future  - Urine Culture Aerobic Bacterial - lab collect; Future  - Wet prep - lab collect; Future    Urine sample without sign of infection, wet prep positive for clue cells and yeast.  Will treat with Flagyl and Diflucan.  Avoid alcohol while taking Flagyl.  I recommended she stop the Macrobid that she is currently taking.  Plenty of fluids.  Follow-up to be seen if symptoms worsen, fail to improve or return shortly after finishing medications.      Return in about 1 week (around 2/2/2024) for visit with primary care provider if not improving.     Ruba Rolon PA-C  [facility]    Subjective   Kamilah Lambert is a 28 year old who presents for the following health issues    HPI    Kamilah presents via telephone for evaluation of a presumed urinary tract infection.  Symptoms first began approximately 1 week ago.  She notes that she was also on her.  When the symptoms first began.  She noticed increased urinary frequency which is also symptoms she has with her period.  However even after the bleeding stopped, she noticed vaginal itching, and odor to her urine and continued urinary frequency.  She took a home UTI test which was positive and she was started on Macrobid.  Today is the third day of her Macrobid and she notes that symptoms have minimally improved but have not resolved.  Yesterday, symptoms seem to worsen once again.      Review of Systems   ROS negative except as stated above.      Objective     Physical Exam   healthy, alert and no distress  PSYCH: Alert and oriented times 3; coherent speech, normal   rate and volume, able to articulate logical thoughts, able   to abstract reason, no tangential thoughts, no hallucinations   or delusions. Affect is normal and pleasant  RESP: No cough, no audible wheezing, able to talk in full sentences  Remainder of exam unable to be completed due to telephone visits    Call duration: 17 minutes  Provider location: offsite at home, [facility]    Results for orders placed or performed in visit on 01/26/24   UA reflex to Microscopic - lab collect     Status: Normal   Result Value Ref Range    Color Urine Yellow Colorless, Straw, Light Yellow, Yellow    Appearance Urine Clear Clear    Glucose Urine Negative Negative mg/dL    Bilirubin Urine Negative Negative    Ketones Urine Negative Negative mg/dL    Specific Gravity Urine 1.015 1.003 - 1.035    Blood Urine Negative Negative    pH Urine 7.0 5.0 - 7.0    Protein Albumin Urine Negative Negative mg/dL    Urobilinogen Urine 0.2 0.2, 1.0 E.U./dL    Nitrite Urine Negative Negative    Leukocyte Esterase Urine Negative Negative    Narrative    Microscopic not indicated   Wet prep - lab collect     Status: Abnormal    Specimen: Vagina; Swab   Result Value Ref Range    Trichomonas Absent Absent    Yeast Present (A) Absent    Clue Cells Present (A) Absent    WBCs/high power field None None

## 2024-01-27 LAB — BACTERIA UR CULT: NORMAL

## 2024-06-30 ENCOUNTER — HEALTH MAINTENANCE LETTER (OUTPATIENT)
Age: 29
End: 2024-06-30

## 2024-10-21 ENCOUNTER — VIRTUAL VISIT (OUTPATIENT)
Dept: URGENT CARE | Facility: CLINIC | Age: 29
End: 2024-10-21
Payer: COMMERCIAL

## 2024-10-21 DIAGNOSIS — U07.1 INFECTION DUE TO 2019 NOVEL CORONAVIRUS: Primary | ICD-10-CM

## 2024-10-21 DIAGNOSIS — H57.89 EYE DRAINAGE: ICD-10-CM

## 2024-10-21 PROCEDURE — 99213 OFFICE O/P EST LOW 20 MIN: CPT | Mod: 95

## 2024-10-21 RX ORDER — POLYMYXIN B SULFATE AND TRIMETHOPRIM 1; 10000 MG/ML; [USP'U]/ML
SOLUTION OPHTHALMIC
Qty: 10 ML | Refills: 0 | Status: SHIPPED | OUTPATIENT
Start: 2024-10-21 | End: 2024-10-28

## 2024-10-21 NOTE — PROGRESS NOTES
Kamilah is a 29 year old female who presents for a billable video visit.    ASSESSMENT/PLAN:    ICD-10-CM    1. Infection due to 2019 novel coronavirus  U07.1       2. Eye drainage  H57.89 polymixin b-trimethoprim (POLYTRIM) 27270-6.1 UNIT/ML-% ophthalmic solution          Suspect patient has viral conjunctivitis. If symptoms should return with purulent drainage and redness a backup prescription of Polytrim was sent to local pharmacy to aid with symptoms.  Encouraged patient for pain they should use Ibuprofen and/or tylenol as needed. Additionally a cool or warm moist compresses over the affected eye, as needed may also help relieve the discomfort  Educated on the infective/ contagious nature of this condition. Encouraged strict hand washing, avoiding touching the eye, not sharing towels or bedding to prevent spread of infection.  Additionally we discussed if symptoms do not improve after starting today's treatment (or if symptoms worsen) to follow up in 3-5 days.  Patient/Parent is agreeable to treatment plan and state they will follow-up if symptoms do not improve and/or if symptoms worsen (see patient's AVS 'monitor for' section for specific patient instructions given and discussed regarding what to watch for and when to follow up)        Red flag symptoms needing urgent evaluation was discussed with patient.    Follow up with primary care provider with any problems, questions or concerns or if symptoms worsen or fail to improve. Patient verbalized understanding and is agreeable to plan.     SUBJECTIVE:  Kamilah Lambert is a 29 year old who presents for  complaining of moderate discharge, mattering, redness in left eye for 2 day(s).   Patient denies trauma/injury, pain, change in vision and light sensitivity.   Onset/timing: unchanged.    Associated Signs and Symptoms: COVID 19 symptoms. Tested + 6 days ago.  Treatment measures tried include: none  Contact wearer: No    Review of Systems  All systems  reviewed and negative except per HPI.      OBJECTIVE:  Vitals not done due to this being a virtual visit    GENERAL: alert and no distress  EYES: Eyes grossly normal to inspection.  No discharge or erythema, or obvious scleral/conjunctival abnormalities.  RESP: No audible wheeze, cough, or visible cyanosis.    SKIN: Visible skin clear. No significant rash, abnormal pigmentation or lesions.  PSYCH: Appropriate affect, tone, and pace of words    Video-Visit Details    Type of service:  Video Visit  Video Start Time: 10:31 AM  Video End Time: 10:39 AM    Originating Location (pt. Location): Home    Distant Location (provider location):  [facility]     Platform used for Video Visit: Rainbow

## 2024-11-25 ENCOUNTER — OFFICE VISIT (OUTPATIENT)
Dept: FAMILY MEDICINE | Facility: CLINIC | Age: 29
End: 2024-11-25
Payer: COMMERCIAL

## 2024-11-25 VITALS
WEIGHT: 237.9 LBS | RESPIRATION RATE: 18 BRPM | BODY MASS INDEX: 37.34 KG/M2 | HEART RATE: 88 BPM | SYSTOLIC BLOOD PRESSURE: 126 MMHG | HEIGHT: 67 IN | OXYGEN SATURATION: 98 % | TEMPERATURE: 98.1 F | DIASTOLIC BLOOD PRESSURE: 80 MMHG

## 2024-11-25 DIAGNOSIS — Z23 NEED FOR IMMUNIZATION AGAINST YELLOW FEVER: ICD-10-CM

## 2024-11-25 DIAGNOSIS — Z23 NEED FOR POLIO VACCINATION: ICD-10-CM

## 2024-11-25 DIAGNOSIS — Z23 NEED FOR IMMUNIZATION AGAINST INFLUENZA: ICD-10-CM

## 2024-11-25 DIAGNOSIS — Z71.84 ENCOUNTER FOR COUNSELING FOR TRAVEL: Primary | ICD-10-CM

## 2024-11-25 DIAGNOSIS — Z23 NEED FOR MENINGOCOCCAL VACCINATION: ICD-10-CM

## 2024-11-25 PROCEDURE — 99402 PREV MED CNSL INDIV APPRX 30: CPT | Mod: 25 | Performed by: PHYSICIAN ASSISTANT

## 2024-11-25 PROCEDURE — 90656 IIV3 VACC NO PRSV 0.5 ML IM: CPT | Mod: GA | Performed by: PHYSICIAN ASSISTANT

## 2024-11-25 PROCEDURE — 90471 IMMUNIZATION ADMIN: CPT | Mod: GA | Performed by: PHYSICIAN ASSISTANT

## 2024-11-25 PROCEDURE — 90472 IMMUNIZATION ADMIN EACH ADD: CPT | Mod: GA | Performed by: PHYSICIAN ASSISTANT

## 2024-11-25 PROCEDURE — 90717 YELLOW FEVER VACCINE SUBQ: CPT | Mod: GA | Performed by: PHYSICIAN ASSISTANT

## 2024-11-25 PROCEDURE — 90619 MENACWY-TT VACCINE IM: CPT | Mod: GA | Performed by: PHYSICIAN ASSISTANT

## 2024-11-25 PROCEDURE — 90713 POLIOVIRUS IPV SC/IM: CPT | Mod: GA | Performed by: PHYSICIAN ASSISTANT

## 2024-11-25 RX ORDER — FLUOCINOLONE ACETONIDE 0.11 MG/ML
OIL TOPICAL
COMMUNITY
Start: 2024-09-18

## 2024-11-25 RX ORDER — LOPERAMIDE HYDROCHLORIDE 2 MG/1
2 TABLET ORAL 4 TIMES DAILY PRN
Qty: 40 TABLET | Refills: 0 | Status: SHIPPED | OUTPATIENT
Start: 2024-11-25

## 2024-11-25 RX ORDER — AZITHROMYCIN 500 MG/1
TABLET, FILM COATED ORAL
Qty: 3 TABLET | Refills: 0 | Status: SHIPPED | OUTPATIENT
Start: 2024-11-25

## 2024-11-25 RX ORDER — KETOCONAZOLE 20 MG/ML
SHAMPOO, SUSPENSION TOPICAL
COMMUNITY
Start: 2024-09-18

## 2024-11-25 RX ORDER — SPIRONOLACTONE 50 MG/1
TABLET, FILM COATED ORAL
COMMUNITY
Start: 2024-09-18

## 2024-11-25 RX ORDER — ATOVAQUONE AND PROGUANIL HYDROCHLORIDE 250; 100 MG/1; MG/1
1 TABLET, FILM COATED ORAL DAILY
Qty: 25 TABLET | Refills: 0 | Status: SHIPPED | OUTPATIENT
Start: 2024-11-25

## 2024-11-25 RX ORDER — TRETINOIN 0.25 MG/G
CREAM TOPICAL
COMMUNITY
Start: 2024-09-18

## 2024-11-25 NOTE — NURSING NOTE
Prior to immunization administration, verified patients identity using patient s name and date of birth. Please see Immunization Activity for additional information.     Screening Questionnaire for Adult Immunization    Are you sick today?   No   Do you have allergies to medications, food, a vaccine component or latex?   No   Have you ever had a serious reaction after receiving a vaccination?   No   Do you have a long-term health problem with heart, lung, kidney, or metabolic disease (e.g., diabetes), asthma, a blood disorder, no spleen, complement component deficiency, a cochlear implant, or a spinal fluid leak?  Are you on long-term aspirin therapy?   No   Do you have cancer, leukemia, HIV/AIDS, or any other immune system problem?   No   Do you have a parent, brother, or sister with an immune system problem?   No   In the past 3 months, have you taken medications that affect  your immune system, such as prednisone, other steroids, or anticancer drugs; drugs for the treatment of rheumatoid arthritis, Crohn s disease, or psoriasis; or have you had radiation treatments?   No   Have you had a seizure, or a brain or other nervous system problem?   No   During the past year, have you received a transfusion of blood or blood    products, or been given immune (gamma) globulin or antiviral drug?   No   For women: Are you pregnant or is there a chance you could become       pregnant during the next month?   No   Have you received any vaccinations in the past 4 weeks?   No     Immunization questionnaire answers were all negative.      Patient instructed to remain in clinic for 15 minutes afterwards, and to report any adverse reactions.     Screening performed by Miranda Cuellar MA on 11/25/2024 at 7:27 AM.

## 2024-11-25 NOTE — PROGRESS NOTES
SUBJECTIVE: Kamilah Lambert , a 29 year old  female, presents for counseling and information regarding upcoming travel to [place]. Special medical concerns include: none. She anticipates the following unusual exposures: none.    Itinerary:  [place]    Departure Date: 12/14/2024    Return date: 12/30/2024    Reason for travel (i.e. Business, pleasure): pleasure    Visiting an urban or rural area?: both    Accommodations (i.e. hotel, hostel, friends, family, etc): hotel and own house      IMMUNIZATION HISTORY  Have you received any vaccinations in the past 4 weeks? If so, which? No  Have you ever fainted from having your blood drawn or from an injection?  No  Have you ever had any bad reaction or side effect from any vaccination?  If so, which? No  Do you live (or work closely) with anyone who has AIDS, an AIDS-like condition, any other immune disorder or who is on chemotherapy for cancer?  No  Have you received any injection of immune globulin or any blood products during the past 12 months?  No    GENERAL MEDICAL HISTORY  Do you have a medical condition that requires medicine or doctor follow-up visits?  No  Do you have a medical condition that is stable now, but that may recur while traveling?  No  Has your spleen been removed?  No  Have you had an illness or a fever in the past 48 hours?  No  Are you pregnant, or might you become pregnant on this trip?  Any chance of pregnancy?  No  Are you breastfeeding?  No  Do you have HIV, AIDS, an AIDS-like condition, any other immune disorder, leukemia or cancer?  No  Have you had your thymus gland removed or history of problems with your thymus, such as myasthenia gravis, DiGeorge syndrome, or thymoma?  No  Do you have a severely low platelet count (thrombocytopenia) or a blood clotting disorder?  No  Have you ever had a convulsion, seizure, epilepsy, neurologic condition or brain infection?  No  Do you have any stomach conditions?  No  Do you have severe renal or  kidney impairment?  No  Do you have a history of mental health concerns?  No  Do you get yeast infections often?  No  Do you have psoriasis?  No  Do you get motion sickness?  No  Have you ever had headaches, nausea, vomiting, or breathing problems from altitude exposure?  No    MEDICINES  Are you taking:   Steroids, prednisone, anti-cancer drugs, or medicines that suppress your immune system? No  Antibiotics or sulfonamides? No  Oral contraceptives (birth control pills)? No  Aspirin therapy (children and teens)? No    ALLERGIES  Are you allergic to:  Any medicines? No  Any foods or other? No  Neomycin, formalin, or fish products? No      No past medical history on file.   Immunization History   Administered Date(s) Administered    COVID-19 12+ (Pfizer) 11/15/2023    COVID-19 Bivalent 18+ (Moderna) 09/29/2022    COVID-19 MONOVALENT 12+ (Pfizer) 12/04/2021    COVID-19 Monovalent 18+ (Moderna) 01/29/2021, 02/26/2021    DTAP (<7y) 1997, 02/06/1998, 04/03/1998, 01/06/1999, 05/24/2002    HEPA 10/29/1999, 03/22/2000    HIB (PRP-T) 1997, 02/06/1998, 04/03/1998, 10/06/1998    HPV 12/02/2013, 04/03/2014, 10/31/2014    HepB 1997, 04/03/1998, 08/10/1998    Influenza (H1N1) 11/24/2009    Influenza (IIV3) PF 11/08/2008, 11/24/2009, 10/26/2010, 10/25/2011, 11/30/2012    Influenza Vaccine >6 months,quad, PF 10/15/2013, 10/25/2014, 12/17/2015, 10/06/2016, 11/10/2017, 12/17/2019, 10/29/2020, 11/15/2023    MMR 10/06/1998, 05/24/2002    Meningococcal (Menomune ) 10/29/1999    Meningococcal ACWY (Menactra ) 05/16/2011    OPV, trivalent, live 08/10/1998    Pneumococcal (PCV 7) 07/03/2000    Poliovirus, inactivated (IPV) 1997, 02/06/1998, 05/24/2002    TDAP Vaccine (Adacel) 02/23/2010    Typhoid IM 07/26/2006, 07/26/2006, 05/16/2011    Varicella 10/06/1998, 02/22/2010       Current Outpatient Medications   Medication Sig Dispense Refill    Fluocinolone Acetonide Scalp 0.01 % OIL oil Apply topically.      FLUoxetine  (PROZAC) 20 MG capsule Take 1 capsule by mouth daily.      ketoconazole (NIZORAL) 2 % external shampoo Shampoo 1-2 times weekly on scalp. Lather up, leave on for 3-5 minutes then rinse out.      spironolactone (ALDACTONE) 50 MG tablet       tretinoin (RETIN-A) 0.025 % external cream Apply a pea sized amount to entire face nightly as tolerated. Start 2 times a week at night, then increase as tolerated.       No Known Allergies     EXAM: deferred    Immunizations discussed include: Typhoid, Yellow Fever, Meningococcus, and Polio  Malaria prophylaxis recommended: Malarone  Symptomatic treatment for traveler's diarrhea: bismuth subsalicylate, loperamide/diphenoxylate, and azithromycin    ASSESSMENT/PLAN:    (Z71.84) Encounter for counseling for travel  (primary encounter diagnosis)    Comment: Yellow fever, polio, meningococcal, and oral typhoid vaccines today. Patient will return or follow-up with PCP as needed. Prophylaxis given for Traveler's diarrhea and Malaria. All questions were answered.     Plan: typhoid (VIVOTIF) CR capsule,         atovaquone-proguanil (MALARONE) 250-100 MG         tablet, azithromycin (ZITHROMAX) 500 MG tablet,        loperamide (IMODIUM A-D) 2 MG tablet            (Z23) Need for meningococcal vaccination  Comment:   Plan: MENINGOCOCCAL ACWY >2Y (MENQUADFI )            (Z23) Need for polio vaccination  Comment:   Plan: POLIOVIRUS 6W-18Y (IPOL)            (Z23) Need for immunization against yellow fever  Comment:   Plan: YELLOW FEVER, LIVE SQ            (Z23) Need for immunization against influenza  Comment:   Plan: INFLUENZA VACCINE, SPLIT VIRUS, TRIVALENT,PF         (FLUZONE\FLUARIX)              I have reviewed general recommendations for safe travel   including: food/water precautions, insect avoidance, safe sex   practices given high prevalence of HIV and other STDs,   roadway safety. Educational materials and links to the CDC   Traveler's health website have been provided.    Total time  23 minutes, greater than 50 percent in counseling   and coordination of care.      ;

## 2024-11-25 NOTE — PATIENT INSTRUCTIONS
"See travel packet provided  Recommend ultrathon (mosquito repellant), pepto bismol and imodium  The food and drink choices you make while traveling can impact your likelihood of getting sick.   If you aren't sure if a food or drink is safe, the saying \" BOIL IT, COOK IT, PEEL IT, OR FORGET IT\" can help you decide whether it's okay to consume.   Also bring hand  and sun screen with you.  Safe Travels     If you first start to get mild to moderate diarrhea, take imodium.      If diarrhea is severe or you have a fever with the diarrhea, take the antibiotic (azithromycin).      Today November 25, 2024 you received the    Flu Vaccine    Yellow Fever (YF)    Polio (IPV) Vaccine    Meningococcal (Menactra) Vaccine    Typhoid - Oral. This prescription has been faxed to your pharmacy, please take as directed. This is valid for 5 years. .    These appointments can be made as a NURSE ONLY visit.    **It is very important for the vaccinations to be given on the scheduled day(s), this helps ensure you receive the full effectiveness of the vaccine.**    Please call [facility] with any questions [phone]    Thank you for visiting [facility]    "

## 2025-07-13 ENCOUNTER — HEALTH MAINTENANCE LETTER (OUTPATIENT)
Age: 30
End: 2025-07-13